# Patient Record
Sex: FEMALE | Race: WHITE | Employment: OTHER | URBAN - METROPOLITAN AREA
[De-identification: names, ages, dates, MRNs, and addresses within clinical notes are randomized per-mention and may not be internally consistent; named-entity substitution may affect disease eponyms.]

---

## 2017-01-01 ENCOUNTER — NURSING HOME VISIT (OUTPATIENT)
Dept: GERIATRICS | Facility: CLINIC | Age: 82
End: 2017-01-01
Payer: COMMERCIAL

## 2017-01-01 ENCOUNTER — TELEPHONE (OUTPATIENT)
Dept: GERIATRICS | Facility: CLINIC | Age: 82
End: 2017-01-01

## 2017-01-01 VITALS
HEART RATE: 61 BPM | TEMPERATURE: 97.7 F | DIASTOLIC BLOOD PRESSURE: 60 MMHG | WEIGHT: 115.7 LBS | OXYGEN SATURATION: 94 % | BODY MASS INDEX: 21.16 KG/M2 | SYSTOLIC BLOOD PRESSURE: 110 MMHG | RESPIRATION RATE: 18 BRPM

## 2017-01-01 VITALS
DIASTOLIC BLOOD PRESSURE: 60 MMHG | SYSTOLIC BLOOD PRESSURE: 112 MMHG | HEART RATE: 66 BPM | RESPIRATION RATE: 18 BRPM | BODY MASS INDEX: 21 KG/M2 | WEIGHT: 114.8 LBS | OXYGEN SATURATION: 95 % | TEMPERATURE: 97.7 F

## 2017-01-01 DIAGNOSIS — R29.6 FALLS FREQUENTLY: ICD-10-CM

## 2017-01-01 DIAGNOSIS — R29.6 FALLS FREQUENTLY: Primary | ICD-10-CM

## 2017-01-01 DIAGNOSIS — L03.90 CELLULITIS, UNSPECIFIED CELLULITIS SITE: ICD-10-CM

## 2017-01-01 DIAGNOSIS — H40.2231 CHRONIC ANGLE-CLOSURE GLAUCOMA OF BOTH EYES, MILD STAGE: ICD-10-CM

## 2017-01-01 DIAGNOSIS — E03.9 ACQUIRED HYPOTHYROIDISM: ICD-10-CM

## 2017-01-01 DIAGNOSIS — M15.0 PRIMARY OSTEOARTHRITIS INVOLVING MULTIPLE JOINTS: Primary | ICD-10-CM

## 2017-01-01 DIAGNOSIS — I87.303 STASIS EDEMA OF BOTH LOWER EXTREMITIES: ICD-10-CM

## 2017-01-01 DIAGNOSIS — M15.0 PRIMARY OSTEOARTHRITIS INVOLVING MULTIPLE JOINTS: ICD-10-CM

## 2017-01-01 DIAGNOSIS — K59.01 SLOW TRANSIT CONSTIPATION: ICD-10-CM

## 2017-01-01 PROCEDURE — 99309 SBSQ NF CARE MODERATE MDM 30: CPT | Performed by: NURSE PRACTITIONER

## 2017-01-01 PROCEDURE — 99308 SBSQ NF CARE LOW MDM 20: CPT | Performed by: INTERNAL MEDICINE

## 2017-01-01 PROCEDURE — 99207 ZZC CDG-CORRECTLY CODED, REVIEWED AND AGREE: CPT | Performed by: INTERNAL MEDICINE

## 2017-01-01 PROCEDURE — 99207 ZZC CDG-CORRECTLY CODED, REVIEWED AND AGREE: CPT | Performed by: NURSE PRACTITIONER

## 2017-01-30 NOTE — PROGRESS NOTES
[facility]    Chief Complaint   Patient presents with     Care [facility]       HPI:    Joleen Hamm is a 89 year old  (6/18/1921), who is being seen today for an episodic care visit at [facility].   Resident moved to [facility] from [facility] on 1/30/17. She has been a resident of [facility] since April 2014.        Today's concern is:  Primary osteoarthritis involving multiple joints  Falls frequently  Resident has been declining over the past few months. Needs more cares for frequent falls.   -States that she has difficulty getting up from chair and getting to the BR.    Cellulitis, unspecified cellulitis site  Current regimen Keflex 500mg po TID started 1/30 until 2/5.  -Denies pain.       ALLERGIES: Review of patient's allergies indicates no known allergies.  Past Medical, Surgical, Family and Social History reviewed and updated in [facility].    Current Outpatient Prescriptions   Medication Sig Dispense Refill     Cephalexin (KEFLEX PO) Take 500 mg by mouth 3 times daily       Dextromethorphan-guaiFENesin  MG/5ML syrup Take 10 mLs by mouth every 4 hours as needed for cough       Nutritional Supplements (BOOST PLUS) Take 1 Bottle by mouth daily       nystatin (MYCOSTATIN) [id number] UNIT/GM POWD Apply to grion topically every day shift for Rash       triamcinolone (KENALOG) 0.1 % cream Apply topically 2 times daily Apply to Right leg topically every day shift for Rash until healed       senna-docusate (SENOKOT-S;PERICOLACE) 8.6-50 MG per tablet Take 1 tablet by mouth 2 times daily 7 tablet      latanoprost (XALATAN) 0.005 % ophthalmic solution Place 1 drop into both eyes daily       trolamine salicylate (ASPERCREME) 10 % cream Apply 1 applicator topically 4 times daily as needed for moderate pain       Levothyroxine Sodium (LEVOTHROID PO) Take 75 mcg by mouth daily        polyethylene glycol (MIRALAX/GLYCOLAX) powder Take 17 g by mouth  daily as needed for constipation       acetaminophen (TYLENOL) 500 MG tablet Take 1,000 mg by mouth 2 times daily        Medications reconciled to facility chart and changes were made to reflect current medications as identified as above med list. Below are the changes that were made:   Medications stopped since last EPIC medication reconciliation:   There are no discontinued medications.    Medications started since last [facility] medication reconciliation:  Orders Placed This Encounter   Medications     Cephalexin (KEFLEX PO)     Sig: Take 500 mg by mouth 3 times daily     REVIEW OF SYSTEMS:  4 point ROS including Respiratory, CV, GI and , other than that noted in the HPI,  is negative    Physical Exam:  /60 mmHg  Pulse 61  Temp(Src) 97.7  F (36.5  C)  Resp 18  Wt 115 lb 11.2 oz (52.481 kg)  SpO2 94%  GENERAL APPEARANCE:  Alert  ENT:  Mouth and posterior oropharynx normal, moist mucous membranes  RESP:  respiratory effort and palpation of chest normal, lungs clear to auscultation , no respiratory distress  CV:  Palpation and auscultation of heart done , regular rate and rhythm, no murmur, rub, or gallop  M/S:   Gait and station normal  Digits and nails normal  SKIN:  wound appearance: healing with course of abx  PSYCH:  affect and mood normal    Recent Labs:      CBC RESULTS:   Recent Labs   Lab Test 04/12/16  02/15/16   2126   WBC  3.9  5.3   RBC  4.00  4.12   HGB  12.9  13.6   HCT  40.1  39.5   MCV  100  96   MCH  32.3  33.0   MCHC  32.2  34.4   RDW  12.9  12.7   PLT  178  182       Last Basic Metabolic Panel:  Recent Labs   Lab Test 04/12/16  02/15/16   2126   06/13/13   0829   NA  132  140   < >  131*   POTASSIUM  4.6  4.3   < >  4.4   CHLORIDE  98  104   < >  97   WESLEY  8.8  8.7   < >  8.3*   CO2   --   31   --   28   BUN  0.49  14   < >  12   CR  10  0.48*   < >  0.59   GLC  80  91   < >  89    < > = values in this interval not displayed.       Liver Function Studies -   Recent Labs   Lab Test   02/15/16   2126  07/06/12   2235   PROTTOTAL  5.9*  6.4*   ALBUMIN  3.1*  3.5   BILITOTAL  0.3  0.7   ALKPHOS  75  76   AST  13  38   ALT  15  20       TSH   Date Value Ref Range Status   06/02/2016 1.40 mcU/mL Final   04/12/2016 0.18 mcU/mL Final       Assessment/Plan:  (M15.0) Primary osteoarthritis involving multiple joints  (primary encounter diagnosis)  (R29.6) Falls frequently  -Requiring more assistance  -Transfer to LTC for support with medication administration, assistance, and ADLs.     (L03.90) Cellulitis, unspecified cellulitis site  -improving with course of antibiotics.   -Denies pain.     Orders:  See above    Time 25 minutes.     Electronically signed by  GRAY Hurst CNP

## 2017-02-25 NOTE — PROGRESS NOTES
Joleen Hamm is a 89 year old female seen February 20, 2017 at [facility] where she has resided for 3 years (admit 1/2014, recently transferred over to [facility]), seen for routine visit.    Patient is seen in her room, up to recliner.   States she is okay with the move, which was done because of her decreased mobility, frequent falls and overall decline.       Patient had open umbilical hernia repair with mesh placement March 2016.  No GI symptoms since that time.          Patient sleeps in recliner, does not have a bed in her room.   Transfers to .    Essentially in a sitting position 24 hours/day, has chronic stasis edema    Ongoing dizziness with fear of falling also limits activity, Treated for hypothyroidism.   Has h/o chronic hyponatremia, improved at last check.   Receives eye gtts for glaucoma.       Past Medical History   Diagnosis Date     Rheumatoid arthritis      Breast Cancer, 40 years ago      Glaucoma (increased eye pressure)      Hypothyroidism    Chronic dizziness with falls.     OA  Stasis edema    SH:  , has stepchildren that live in [place].      ROS: negative except for above. No dyspnea, CP, no GI symptoms     EXAM:  Pleasant, NAD  /60  Pulse 66  Temp 97.7  F (36.5  C)  Resp 18  Wt 114 lb 12.8 oz (52.1 kg)  SpO2 95%  BMI 21 kg/m2   Neck supple without adenopathy  Lungs clear bilaterally with fair air movement  Heart RRR s1, harsh 3/6 ILAN  Abd soft, NT, no distention, +BS  Ext 1+ edema, wearing wraps.   Severe arthritic deformity of right knee > left  Neuro: no tremor or stiffness, non-focal  Affect okay    TSH   Date Value Ref Range Status   06/02/2016 1.40 mcU/mL Final       IMP/PLAN:     Decline in mobility with frequent falls  Comment: multifactorial in origin  Plan: LTC support for assist with meds, meals, activity, and safety    Osteoarthritis  Comment: DJD in knees with deformity and pain  Plan: scheduled and prn acetaminophen, analgesic balm, local measures,  mobility as above.  She repeats that she is not interested in further tx.          Hypothyroidism  Comment: on replacement.    Plan: follow TSH yearly     Glaucoma  Plan: continue daily gtts, follow up with Ophthalmology.     Chronic constipation  Plan:continue Lei Coker MD

## 2017-02-27 NOTE — TELEPHONE ENCOUNTER
Patient with decreased responsiveness, mottled - appears to be dying. Goals are for comfort per staff. DNR/DNI on POLST. Patient has no comfort related orders.     PLAN  O2 1-3 liters per NC PRN comfort  Roxanol 20 mg/ml 5 mg PO or SL every 2 hrs PRN pain, dyspnea.     Electronically signed by Maki Mora, GRAY, GNP